# Patient Record
Sex: MALE | Race: BLACK OR AFRICAN AMERICAN | NOT HISPANIC OR LATINO | URBAN - METROPOLITAN AREA
[De-identification: names, ages, dates, MRNs, and addresses within clinical notes are randomized per-mention and may not be internally consistent; named-entity substitution may affect disease eponyms.]

---

## 2017-01-28 ENCOUNTER — INPATIENT (INPATIENT)
Facility: HOSPITAL | Age: 82
LOS: 10 days | Discharge: NURSING, REHAB OR LONG-TERM CARE | End: 2017-02-08
Attending: HOSPITALIST | Admitting: HOSPITALIST
Payer: MEDICARE

## 2017-01-28 VITALS
SYSTOLIC BLOOD PRESSURE: 185 MMHG | RESPIRATION RATE: 16 BRPM | HEART RATE: 76 BPM | TEMPERATURE: 98 F | DIASTOLIC BLOOD PRESSURE: 90 MMHG

## 2017-01-28 DIAGNOSIS — E03.9 HYPOTHYROIDISM, UNSPECIFIED: ICD-10-CM

## 2017-01-28 DIAGNOSIS — F03.91 UNSPECIFIED DEMENTIA WITH BEHAVIORAL DISTURBANCE: ICD-10-CM

## 2017-01-28 DIAGNOSIS — L30.9 DERMATITIS, UNSPECIFIED: ICD-10-CM

## 2017-01-28 DIAGNOSIS — D64.9 ANEMIA, UNSPECIFIED: ICD-10-CM

## 2017-01-28 DIAGNOSIS — I10 ESSENTIAL (PRIMARY) HYPERTENSION: ICD-10-CM

## 2017-01-28 DIAGNOSIS — G31.83 NEUROCOGNITIVE DISORDER WITH LEWY BODIES: ICD-10-CM

## 2017-01-28 LAB
ALBUMIN SERPL ELPH-MCNC: 3.4 G/DL — SIGNIFICANT CHANGE UP (ref 3.3–5)
ALP SERPL-CCNC: 83 U/L — SIGNIFICANT CHANGE UP (ref 40–120)
ALT FLD-CCNC: 19 U/L — SIGNIFICANT CHANGE UP (ref 4–41)
APPEARANCE UR: SIGNIFICANT CHANGE UP
AST SERPL-CCNC: 29 U/L — SIGNIFICANT CHANGE UP (ref 4–40)
BACTERIA # UR AUTO: SIGNIFICANT CHANGE UP
BASOPHILS # BLD AUTO: 0.09 K/UL — SIGNIFICANT CHANGE UP (ref 0–0.2)
BASOPHILS NFR BLD AUTO: 1.1 % — SIGNIFICANT CHANGE UP (ref 0–2)
BILIRUB SERPL-MCNC: 0.6 MG/DL — SIGNIFICANT CHANGE UP (ref 0.2–1.2)
BILIRUB UR-MCNC: NEGATIVE — SIGNIFICANT CHANGE UP
BLOOD UR QL VISUAL: NEGATIVE — SIGNIFICANT CHANGE UP
BUN SERPL-MCNC: 11 MG/DL — SIGNIFICANT CHANGE UP (ref 7–23)
CALCIUM SERPL-MCNC: 9.1 MG/DL — SIGNIFICANT CHANGE UP (ref 8.4–10.5)
CHLORIDE SERPL-SCNC: 104 MMOL/L — SIGNIFICANT CHANGE UP (ref 98–107)
CO2 SERPL-SCNC: 24 MMOL/L — SIGNIFICANT CHANGE UP (ref 22–31)
COLOR SPEC: YELLOW — SIGNIFICANT CHANGE UP
CREAT SERPL-MCNC: 0.9 MG/DL — SIGNIFICANT CHANGE UP (ref 0.5–1.3)
EOSINOPHIL # BLD AUTO: 0.26 K/UL — SIGNIFICANT CHANGE UP (ref 0–0.5)
EOSINOPHIL NFR BLD AUTO: 3.2 % — SIGNIFICANT CHANGE UP (ref 0–6)
GLUCOSE SERPL-MCNC: 102 MG/DL — HIGH (ref 70–99)
GLUCOSE UR-MCNC: NEGATIVE — SIGNIFICANT CHANGE UP
HCT VFR BLD CALC: 34.2 % — LOW (ref 39–50)
HGB BLD-MCNC: 11.3 G/DL — LOW (ref 13–17)
HYALINE CASTS # UR AUTO: SIGNIFICANT CHANGE UP (ref 0–?)
IMM GRANULOCYTES NFR BLD AUTO: 0.4 % — SIGNIFICANT CHANGE UP (ref 0–1.5)
KETONES UR-MCNC: NEGATIVE — SIGNIFICANT CHANGE UP
LEUKOCYTE ESTERASE UR-ACNC: NEGATIVE — SIGNIFICANT CHANGE UP
LYMPHOCYTES # BLD AUTO: 1.69 K/UL — SIGNIFICANT CHANGE UP (ref 1–3.3)
LYMPHOCYTES # BLD AUTO: 21.1 % — SIGNIFICANT CHANGE UP (ref 13–44)
MCHC RBC-ENTMCNC: 30 PG — SIGNIFICANT CHANGE UP (ref 27–34)
MCHC RBC-ENTMCNC: 33 % — SIGNIFICANT CHANGE UP (ref 32–36)
MCV RBC AUTO: 90.7 FL — SIGNIFICANT CHANGE UP (ref 80–100)
MONOCYTES # BLD AUTO: 0.71 K/UL — SIGNIFICANT CHANGE UP (ref 0–0.9)
MONOCYTES NFR BLD AUTO: 8.9 % — SIGNIFICANT CHANGE UP (ref 2–14)
MUCOUS THREADS # UR AUTO: SIGNIFICANT CHANGE UP
NEUTROPHILS # BLD AUTO: 5.23 K/UL — SIGNIFICANT CHANGE UP (ref 1.8–7.4)
NEUTROPHILS NFR BLD AUTO: 65.3 % — SIGNIFICANT CHANGE UP (ref 43–77)
NITRITE UR-MCNC: NEGATIVE — SIGNIFICANT CHANGE UP
NON-SQ EPI CELLS # UR AUTO: <1 — SIGNIFICANT CHANGE UP
PH UR: 6 — SIGNIFICANT CHANGE UP (ref 4.6–8)
PLATELET # BLD AUTO: 185 K/UL — SIGNIFICANT CHANGE UP (ref 150–400)
PMV BLD: 10.9 FL — SIGNIFICANT CHANGE UP (ref 7–13)
POTASSIUM SERPL-MCNC: 4.2 MMOL/L — SIGNIFICANT CHANGE UP (ref 3.5–5.3)
POTASSIUM SERPL-SCNC: 4.2 MMOL/L — SIGNIFICANT CHANGE UP (ref 3.5–5.3)
PROT SERPL-MCNC: 7.5 G/DL — SIGNIFICANT CHANGE UP (ref 6–8.3)
PROT UR-MCNC: 100 — SIGNIFICANT CHANGE UP
RBC # BLD: 3.77 M/UL — LOW (ref 4.2–5.8)
RBC # FLD: 12.9 % — SIGNIFICANT CHANGE UP (ref 10.3–14.5)
RBC CASTS # UR COMP ASSIST: SIGNIFICANT CHANGE UP (ref 0–?)
SODIUM SERPL-SCNC: 142 MMOL/L — SIGNIFICANT CHANGE UP (ref 135–145)
SP GR SPEC: 1.02 — SIGNIFICANT CHANGE UP (ref 1–1.03)
SQUAMOUS # UR AUTO: SIGNIFICANT CHANGE UP
TSH SERPL-MCNC: 4.3 UIU/ML — HIGH (ref 0.27–4.2)
UROBILINOGEN FLD QL: NORMAL E.U. — SIGNIFICANT CHANGE UP (ref 0.1–0.2)
WBC # BLD: 8.01 K/UL — SIGNIFICANT CHANGE UP (ref 3.8–10.5)
WBC # FLD AUTO: 8.01 K/UL — SIGNIFICANT CHANGE UP (ref 3.8–10.5)
WBC UR QL: SIGNIFICANT CHANGE UP (ref 0–?)

## 2017-01-28 PROCEDURE — 70450 CT HEAD/BRAIN W/O DYE: CPT | Mod: 26

## 2017-01-28 PROCEDURE — 99223 1ST HOSP IP/OBS HIGH 75: CPT

## 2017-01-28 PROCEDURE — 71020: CPT | Mod: 26

## 2017-01-28 RX ORDER — TIMOLOL 0.5 %
1 DROPS OPHTHALMIC (EYE)
Qty: 0 | Refills: 0 | COMMUNITY

## 2017-01-28 RX ORDER — QUETIAPINE FUMARATE 200 MG/1
50 TABLET, FILM COATED ORAL AT BEDTIME
Qty: 0 | Refills: 0 | Status: DISCONTINUED | OUTPATIENT
Start: 2017-01-28 | End: 2017-01-30

## 2017-01-28 RX ORDER — LEVOTHYROXINE SODIUM 125 MCG
1 TABLET ORAL
Qty: 0 | Refills: 0 | COMMUNITY

## 2017-01-28 RX ORDER — TIMOLOL 0.5 %
1 DROPS OPHTHALMIC (EYE) DAILY
Qty: 0 | Refills: 0 | Status: DISCONTINUED | OUTPATIENT
Start: 2017-01-28 | End: 2017-02-08

## 2017-01-28 RX ORDER — ENOXAPARIN SODIUM 100 MG/ML
40 INJECTION SUBCUTANEOUS EVERY 24 HOURS
Qty: 0 | Refills: 0 | Status: DISCONTINUED | OUTPATIENT
Start: 2017-01-28 | End: 2017-02-08

## 2017-01-28 RX ORDER — AMLODIPINE BESYLATE 2.5 MG/1
5 TABLET ORAL DAILY
Qty: 0 | Refills: 0 | Status: DISCONTINUED | OUTPATIENT
Start: 2017-01-28 | End: 2017-02-07

## 2017-01-28 RX ORDER — LEVOTHYROXINE SODIUM 125 MCG
75 TABLET ORAL DAILY
Qty: 0 | Refills: 0 | Status: DISCONTINUED | OUTPATIENT
Start: 2017-01-28 | End: 2017-02-08

## 2017-01-28 RX ORDER — SPIRONOLACTONE 25 MG/1
25 TABLET, FILM COATED ORAL DAILY
Qty: 0 | Refills: 0 | Status: DISCONTINUED | OUTPATIENT
Start: 2017-01-28 | End: 2017-02-08

## 2017-01-28 RX ADMIN — Medication 1 DROP(S): at 22:49

## 2017-01-28 RX ADMIN — ENOXAPARIN SODIUM 40 MILLIGRAM(S): 100 INJECTION SUBCUTANEOUS at 22:49

## 2017-01-28 RX ADMIN — QUETIAPINE FUMARATE 50 MILLIGRAM(S): 200 TABLET, FILM COATED ORAL at 22:49

## 2017-01-28 NOTE — PATIENT PROFILE ADULT. - REASON FOR ADMISSION
Pt extremely difficult to get history from. States he was walking in home and felt epigastric, CP and vertigo. Epigastric pain radiated to his back and lasted  approx 45 minutes and self resolved. Pt called Dr Silva and was told to go to ER

## 2017-01-28 NOTE — ED ADULT TRIAGE NOTE - CHIEF COMPLAINT QUOTE
as per ems wife called because Pt. would not get up off chair, he has been hallucinating and not taking meds. Wife stated Pt. is in early stages of dementia and has been agitated.

## 2017-01-28 NOTE — ED PROVIDER NOTE - CARDIAC, MLM
Normal rate, regular rhythm.  Heart sounds S1, S2.  No murmurs, rubs or gallops. Pitting edema to bilateral lower extremities to mid-shin.

## 2017-01-28 NOTE — ED ADULT NURSE REASSESSMENT NOTE - NS ED NURSE REASSESS COMMENT FT1
received report from RN, pt is awake with nurse at bedside, oriented to himself and wife but not to time or situation which wife states is patients baseline, VSS and patient attempting to provide urine specimen, no acute distress noted

## 2017-01-28 NOTE — ED PROVIDER NOTE - ATTENDING CONTRIBUTION TO CARE
I, Nakia Matthews M.D. have examined the patient and confirmed the essential components of the history, physical examination, diagnosis, and treatment plan. I agree with the patient's care as documented by the resident and amended herein by me. See note above for complete details of service.  80 yo M Hx HTN, Dementia, Hypothyroidism who was brought in my wife to ED for worsening confusion and safety issues at home. As per wife hallucinations are at baseline for the patient 2/2 dementia, however his confusion and agitation are not customary. Pt was noted to turn on the gas to the stove without lighting the  or cooking food. Wife was also unable to get him out of the house to his doctor's office. Wife who lives alone with pt without additional assistance is concerned about her own and the patient's safety given the current change in his mental status. No recent falls, trauma or infectious complaints. Plan - organic w/up for causes of AMS/worsening dementia. If w/up neg, social admission for julianna-psych eval and placement.

## 2017-01-28 NOTE — PATIENT PROFILE ADULT. - VISION (WITH CORRECTIVE LENSES IF THE PATIENT USUALLY WEARS THEM):
wears glasses for distance and reading/Partially impaired: cannot see medication labels or newsprint, but can see obstacles in path, and the surrounding layout; can count fingers at arm's length

## 2017-01-28 NOTE — H&P ADULT. - PROBLEM SELECTOR PLAN 1
Unclear if due to Alzheimer's vs. Lewy Body type but behavioral disturbance seems likely due to progression of dementia rather than organic cause   - Would call PCP/neurologist on Monday morning to obtain records of workup   - Check ammonia and B12 level in AM   - SW for disposition ([facility] vs. NH; wife wants to discuss options)  - C/w Quetiapine for behavioral disturbance; consider increasing dose if needed  - PT consult placed

## 2017-01-28 NOTE — ED PROVIDER NOTE - MEDICAL DECISION MAKING DETAILS
81M h/o dementia, hypothyroid p/w agitation, decreased appetite, VH, x2 wks, concerning for worsening dementia  -labs, ekg, cxr, u/a  -admit

## 2017-01-28 NOTE — ED ADULT NURSE REASSESSMENT NOTE - NS ED NURSE REASSESS COMMENT FT1
patient is incontinent and noted he has IAD on his buttocks, no drainage noted, patient is still confused and wife is at bedside.

## 2017-01-28 NOTE — H&P ADULT. - PROBLEM SELECTOR PLAN 4
Diffuse rash over body; seeing derm as outpatient and wife states he was recently started on what sounds like medrol dose pack and possibly steroid cream.   - Would call dermatologist office (Dr. Ayala [phone]) prior to starting either as etiology of rash unclear

## 2017-01-28 NOTE — H&P ADULT. - ASSESSMENT
80 yo M h/o HTN, hypothyroid, and dementia (Lewy-body vs Parkinson) who p/w with agitation and worsening confusion, admitted for psychosocial management and social disposition.

## 2017-01-28 NOTE — ED PROVIDER NOTE - OBJECTIVE STATEMENT
81M h/o HTN, hypothyroid, dementia (Lewy-body vs Parkinson) presenting with agitation. Pt's wife notes that for past 2 weeks, pt has had more frequent episodes of agitation, decreased appetite, has been sleeping more throughout day, confused, and had visual hallucinations of people in the house. Has had 2 previous visits to ER for same complaint, pt's wife does not feel she can care for patient at home any longer. Notes finding pt in apartment earlier today with smell of gas as he left stove on. ROS + urinary urgency. Denies F, cough, CP/SOB, abd pain, N/V/D.

## 2017-01-28 NOTE — H&P ADULT. - HISTORY OF PRESENT ILLNESS
This is an 82 yo M h/o HTN, hypothyroid, and dementia (Lewy-body vs Parkinson) who p/w with agitation and worsening confusion. Patient pleasantly demented and unable to give accurate history therefore, hx taken per wife at bedside who is patient's primary caretaker. Pt's wife notes that for past 2 weeks, pt has had more frequent episodes of agitation, decreased appetite, has been sleeping more throughout day, confused, and had visual hallucinations of people in the house. Has had 2 previous visits to ER for same complaint, pt's wife does not feel she can care for patient at home any longer. Notes finding pt in apartment earlier today with smell of gas as he left stove on. Of note, patient has had extensive workup by PCP (Dr. Sherman) along with neurologist as an outpatient. Also of note, patient has chronic diffuse scaling rash for which he was recently seen by dermatology, who prescribed him a medrol dose pack?? and topical steroid??. Denies any fevers, new chills, abdominal discomfort, frequency, urgency, dysuria, cough, CP/SOB, abd pain, and N/V/D. Patient admitted to medicine for social disposition given increasing burden on caretaker following negative lab workup.

## 2017-01-29 LAB
AMMONIA BLD-MCNC: 28 UMOL/L — SIGNIFICANT CHANGE UP (ref 11–55)
BUN SERPL-MCNC: 11 MG/DL — SIGNIFICANT CHANGE UP (ref 7–23)
CALCIUM SERPL-MCNC: 8.7 MG/DL — SIGNIFICANT CHANGE UP (ref 8.4–10.5)
CHLORIDE SERPL-SCNC: 102 MMOL/L — SIGNIFICANT CHANGE UP (ref 98–107)
CO2 SERPL-SCNC: 25 MMOL/L — SIGNIFICANT CHANGE UP (ref 22–31)
CREAT SERPL-MCNC: 0.9 MG/DL — SIGNIFICANT CHANGE UP (ref 0.5–1.3)
GLUCOSE SERPL-MCNC: 87 MG/DL — SIGNIFICANT CHANGE UP (ref 70–99)
HCT VFR BLD CALC: 32.5 % — LOW (ref 39–50)
HGB BLD-MCNC: 10.8 G/DL — LOW (ref 13–17)
MCHC RBC-ENTMCNC: 29.8 PG — SIGNIFICANT CHANGE UP (ref 27–34)
MCHC RBC-ENTMCNC: 33.2 % — SIGNIFICANT CHANGE UP (ref 32–36)
MCV RBC AUTO: 89.8 FL — SIGNIFICANT CHANGE UP (ref 80–100)
PLATELET # BLD AUTO: 180 K/UL — SIGNIFICANT CHANGE UP (ref 150–400)
PMV BLD: 10.8 FL — SIGNIFICANT CHANGE UP (ref 7–13)
POTASSIUM SERPL-MCNC: 3.8 MMOL/L — SIGNIFICANT CHANGE UP (ref 3.5–5.3)
POTASSIUM SERPL-SCNC: 3.8 MMOL/L — SIGNIFICANT CHANGE UP (ref 3.5–5.3)
RBC # BLD: 3.62 M/UL — LOW (ref 4.2–5.8)
RBC # FLD: 12.5 % — SIGNIFICANT CHANGE UP (ref 10.3–14.5)
SODIUM SERPL-SCNC: 141 MMOL/L — SIGNIFICANT CHANGE UP (ref 135–145)
VIT B12 SERPL-MCNC: 378 PG/ML — SIGNIFICANT CHANGE UP (ref 200–900)
WBC # BLD: 8.99 K/UL — SIGNIFICANT CHANGE UP (ref 3.8–10.5)
WBC # FLD AUTO: 8.99 K/UL — SIGNIFICANT CHANGE UP (ref 3.8–10.5)

## 2017-01-29 PROCEDURE — 99232 SBSQ HOSP IP/OBS MODERATE 35: CPT

## 2017-01-29 RX ADMIN — SPIRONOLACTONE 25 MILLIGRAM(S): 25 TABLET, FILM COATED ORAL at 07:02

## 2017-01-29 RX ADMIN — QUETIAPINE FUMARATE 50 MILLIGRAM(S): 200 TABLET, FILM COATED ORAL at 22:18

## 2017-01-29 RX ADMIN — Medication 1 DROP(S): at 12:50

## 2017-01-29 RX ADMIN — ENOXAPARIN SODIUM 40 MILLIGRAM(S): 100 INJECTION SUBCUTANEOUS at 22:18

## 2017-01-29 RX ADMIN — AMLODIPINE BESYLATE 5 MILLIGRAM(S): 2.5 TABLET ORAL at 07:01

## 2017-01-29 RX ADMIN — Medication 75 MICROGRAM(S): at 07:01

## 2017-01-29 RX ADMIN — Medication 1 APPLICATION(S): at 22:18

## 2017-01-30 ENCOUNTER — TRANSCRIPTION ENCOUNTER (OUTPATIENT)
Age: 82
End: 2017-01-30

## 2017-01-30 LAB
BUN SERPL-MCNC: 14 MG/DL — SIGNIFICANT CHANGE UP (ref 7–23)
CALCIUM SERPL-MCNC: 8.6 MG/DL — SIGNIFICANT CHANGE UP (ref 8.4–10.5)
CHLORIDE SERPL-SCNC: 103 MMOL/L — SIGNIFICANT CHANGE UP (ref 98–107)
CO2 SERPL-SCNC: 23 MMOL/L — SIGNIFICANT CHANGE UP (ref 22–31)
CREAT SERPL-MCNC: 0.99 MG/DL — SIGNIFICANT CHANGE UP (ref 0.5–1.3)
GLUCOSE SERPL-MCNC: 77 MG/DL — SIGNIFICANT CHANGE UP (ref 70–99)
HCT VFR BLD CALC: 32.9 % — LOW (ref 39–50)
HGB BLD-MCNC: 10.9 G/DL — LOW (ref 13–17)
MCHC RBC-ENTMCNC: 29.5 PG — SIGNIFICANT CHANGE UP (ref 27–34)
MCHC RBC-ENTMCNC: 33.1 % — SIGNIFICANT CHANGE UP (ref 32–36)
MCV RBC AUTO: 89.2 FL — SIGNIFICANT CHANGE UP (ref 80–100)
PLATELET # BLD AUTO: 176 K/UL — SIGNIFICANT CHANGE UP (ref 150–400)
PMV BLD: 11 FL — SIGNIFICANT CHANGE UP (ref 7–13)
POTASSIUM SERPL-MCNC: 4 MMOL/L — SIGNIFICANT CHANGE UP (ref 3.5–5.3)
POTASSIUM SERPL-SCNC: 4 MMOL/L — SIGNIFICANT CHANGE UP (ref 3.5–5.3)
RBC # BLD: 3.69 M/UL — LOW (ref 4.2–5.8)
RBC # FLD: 12.4 % — SIGNIFICANT CHANGE UP (ref 10.3–14.5)
SODIUM SERPL-SCNC: 139 MMOL/L — SIGNIFICANT CHANGE UP (ref 135–145)
WBC # BLD: 9.39 K/UL — SIGNIFICANT CHANGE UP (ref 3.8–10.5)
WBC # FLD AUTO: 9.39 K/UL — SIGNIFICANT CHANGE UP (ref 3.8–10.5)

## 2017-01-30 PROCEDURE — 99233 SBSQ HOSP IP/OBS HIGH 50: CPT

## 2017-01-30 PROCEDURE — 73070 X-RAY EXAM OF ELBOW: CPT | Mod: 26,RT

## 2017-01-30 PROCEDURE — 99223 1ST HOSP IP/OBS HIGH 75: CPT

## 2017-01-30 PROCEDURE — 73060 X-RAY EXAM OF HUMERUS: CPT | Mod: 26,RT

## 2017-01-30 PROCEDURE — 73030 X-RAY EXAM OF SHOULDER: CPT | Mod: 26,RT

## 2017-01-30 PROCEDURE — 99223 1ST HOSP IP/OBS HIGH 75: CPT | Mod: GC

## 2017-01-30 RX ORDER — KETOCONAZOLE 20 MG/G
1 AEROSOL, FOAM TOPICAL
Qty: 0 | Refills: 0 | Status: DISCONTINUED | OUTPATIENT
Start: 2017-01-30 | End: 2017-02-08

## 2017-01-30 RX ORDER — OLANZAPINE 15 MG/1
2.5 TABLET, FILM COATED ORAL EVERY 6 HOURS
Qty: 0 | Refills: 0 | Status: DISCONTINUED | OUTPATIENT
Start: 2017-01-30 | End: 2017-02-06

## 2017-01-30 RX ORDER — QUETIAPINE FUMARATE 200 MG/1
50 TABLET, FILM COATED ORAL
Qty: 0 | Refills: 0 | Status: DISCONTINUED | OUTPATIENT
Start: 2017-01-30 | End: 2017-02-01

## 2017-01-30 RX ORDER — OLANZAPINE 15 MG/1
2.5 TABLET, FILM COATED ORAL EVERY 6 HOURS
Qty: 0 | Refills: 0 | Status: DISCONTINUED | OUTPATIENT
Start: 2017-01-30 | End: 2017-02-08

## 2017-01-30 RX ORDER — PETROLATUM,WHITE
1 JELLY (GRAM) TOPICAL THREE TIMES A DAY
Qty: 0 | Refills: 0 | Status: DISCONTINUED | OUTPATIENT
Start: 2017-01-30 | End: 2017-02-08

## 2017-01-30 RX ADMIN — Medication 1 APPLICATION(S): at 06:08

## 2017-01-30 RX ADMIN — QUETIAPINE FUMARATE 50 MILLIGRAM(S): 200 TABLET, FILM COATED ORAL at 19:06

## 2017-01-30 RX ADMIN — AMLODIPINE BESYLATE 5 MILLIGRAM(S): 2.5 TABLET ORAL at 06:08

## 2017-01-30 RX ADMIN — SPIRONOLACTONE 25 MILLIGRAM(S): 25 TABLET, FILM COATED ORAL at 06:08

## 2017-01-30 RX ADMIN — ENOXAPARIN SODIUM 40 MILLIGRAM(S): 100 INJECTION SUBCUTANEOUS at 23:00

## 2017-01-30 RX ADMIN — Medication 75 MICROGRAM(S): at 06:08

## 2017-01-30 RX ADMIN — Medication 1 APPLICATION(S): at 23:00

## 2017-01-30 RX ADMIN — KETOCONAZOLE 1 APPLICATION(S): 20 AEROSOL, FOAM TOPICAL at 23:00

## 2017-01-30 RX ADMIN — Medication 1 APPLICATION(S): at 23:03

## 2017-01-30 RX ADMIN — Medication 1 DROP(S): at 11:38

## 2017-01-30 RX ADMIN — Medication 1 APPLICATION(S): at 17:04

## 2017-01-30 NOTE — DISCHARGE NOTE ADULT - MEDICATION SUMMARY - MEDICATIONS TO STOP TAKING
I will STOP taking the medications listed below when I get home from the hospital:    Medrol Dosepak 4 mg oral tablet  -- 1 milligram(s) by mouth

## 2017-01-30 NOTE — DISCHARGE NOTE ADULT - PATIENT PORTAL LINK FT
“You can access the Follow[facility] Patient Portal, offered by [facility], by registering with the following website: [email or web address]”

## 2017-01-30 NOTE — DISCHARGE NOTE ADULT - HOSPITAL COURSE
This is an 80 yo M h/o HTN, hypothyroid, and dementia (Lewy-body vs Parkinson) who p/w with agitation and worsening confusion. Patient pleasantly demented and unable to give accurate history therefore, hx taken per wife at bedside who is patient's primary caretaker. Pt's wife notes that for past 2 weeks, pt has had more frequent episodes of agitation, decreased appetite, has been sleeping more throughout day, confused, and had visual hallucinations of people in the house. Has had 2 previous visits to ER for same complaint, pt's wife does not feel she can care for patient at home any longer. Notes finding pt in apartment earlier today with smell of gas as he left stove on. Of note, patient has had extensive workup by PCP (Dr. Sherman) along with neurologist as an outpatient. Also of note, patient has chronic diffuse scaling rash for which he was recently seen by dermatology, who prescribed him a medrol dose pack?? and topical steroid??. Denies any fevers, new chills, abdominal discomfort, frequency, urgency, dysuria, cough, CP/SOB, abd pain, and N/V/D. Patient admitted to medicine for social disposition given increasing burden on caretaker following negative lab workup.     HOSPITAL COURSE:  Patient had CT of head, CBc/Ua/ xry of  sgunts - all wnl.  He was no longer aggitated but he was confused OX1 only- Psych was called to evaluate.  He was also noted to have rash with areas of excoriations- [facility]Dermatology consult called ( Dr Odonnell does not come to [facility]).  SW alerted that wife needed help with patient. This is an 82 yo M h/o HTN, hypothyroid, and dementia (Lewy-body vs Parkinson) who p/w with agitation and worsening confusion. Patient pleasantly demented and unable to give accurate history therefore, hx taken per wife at bedside who is patient's primary caretaker. Pt's wife notes that for past 2 weeks, pt has had more frequent episodes of agitation, decreased appetite, has been sleeping more throughout day, confused, and had visual hallucinations of people in the house. Has had 2 previous visits to ER for same complaint, pt's wife does not feel she can care for patient at home any longer. Notes finding pt in apartment earlier today with smell of gas as he left stove on. Of note, patient has had extensive workup by PCP (Dr. Sherman) along with neurologist as an outpatient. Also of note, patient has chronic diffuse scaling rash for which he was recently seen by dermatology, who prescribed him a medrol dose pack?? and topical steroid??. Denies any fevers, new chills, abdominal discomfort, frequency, urgency, dysuria, cough, CP/SOB, abd pain, and N/V/D. Patient admitted to medicine for social disposition given increasing burden on caretaker following negative lab workup.     HOSPITAL COURSE:  Patient had CT of head, CBc/Ua/ xry of  shunts - all wnl.  He was no longer agitated but he was confused OX1 only- Psych was called to evaluate.  He was also noted to have rash with areas of excoriations- [facility]Dermatology consult called ( Dr Odonnell does not come to [facility]).  SW alerted that wife needed help with patient.  Dermatology dx patient with dermatitis and cutaneus candidiasis  treated with Nizoral to groin and folds and Kenolog .  Psychiatry resumed his home medications and patient was no longer agitated - remained pleasantly confused.  Xrays or R shoulders/r elbows and L hand- no acute fractures. He as DJD with bony osteophites.  PT rec rehab with rolling walker.  He was d/c to ................. on .................. This is an 80 yo M h/o HTN, hypothyroid, and dementia (Lewy-body vs Parkinson) who p/w with agitation and worsening confusion. Patient pleasantly demented and unable to give accurate history therefore, hx taken per wife at bedside who is patient's primary caretaker. Pt's wife notes that for past 2 weeks, pt has had more frequent episodes of agitation, decreased appetite, has been sleeping more throughout day, confused, and had visual hallucinations of people in the house. Has had 2 previous visits to ER for same complaint, pt's wife does not feel she can care for patient at home any longer. Notes finding pt in apartment earlier today with smell of gas as he left stove on. Of note, patient has had extensive workup by PCP (Dr. Sherman) along with neurologist as an outpatient. Also of note, patient has chronic diffuse scaling rash for which he was recently seen by dermatology, who prescribed him a medrol dose pack?? and topical steroid??. Denies any fevers, new chills, abdominal discomfort, frequency, urgency, dysuria, cough, CP/SOB, abd pain, and N/V/D. Patient admitted to medicine for social disposition given increasing burden on caretaker following negative lab workup.     HOSPITAL COURSE:  Patient had CT of head, CBc/Ua/ xry of  shunts - all wnl.  He was no longer agitated but he was confused OX1 only- Psych was called to evaluate.  He was also noted to have rash with areas of excoriations- [facility]atology consult called ( Dr Odonnell does not come to [facility]).  SW alerted that wife needed help with patient.  Dermatology dx patient with dermatitis and cutaneus candidiasis  treated with Nizoral to groin and folds and Kenolog .  Psychiatry resumed his home medications and patient was no longer agitated - remained pleasantly confused.  Xrays or R shoulders/r elbows and L hand- no acute fractures. He as DJD with bony osteophites.  PT rec rehab with rolling walker.  He was d/c to  REHAB on ..................

## 2017-01-30 NOTE — DISCHARGE NOTE ADULT - MEDICATION SUMMARY - MEDICATIONS TO TAKE
I will START or STAY ON the medications listed below when I get home from the hospital:    Aldactone 25 mg oral tablet  -- 1 tab(s) by mouth once a day  -- Indication: For Essential hypertension    mirtazapine 7.5 mg oral tablet  -- 1 tab(s) by mouth once a day (at bedtime)  -- Indication: For Appetite stimulator    QUEtiapine 50 mg oral tablet  -- 1 tab(s) by mouth   given at 5:30 pm  -- Indication: For Antipsychotic    QUEtiapine 25 mg oral tablet  -- 1 tab(s) by mouth once a day  to be given at 11 am  -- Indication: For Antipsychotic    Norvasc 5 mg oral tablet  -- 1 tab(s) by mouth once a day  -- Indication: For Essential hypertension    ketoconazole 2% topical cream  -- 1 application on skin 2 times a day  -- Indication: For skin care    triamcinolone 0.1% topical ointment  -- 1 application on skin 2 times a day  -- Indication: For skin care     petrolatum topical ointment  -- 1 application on skin 3 times a day  -- Indication: For skin care    senna oral tablet  -- 2 tab(s) by mouth once a day (at bedtime)  -- Indication: For constipation    docusate sodium 100 mg oral capsule  -- 1 cap(s) by mouth 3 times a day  -- Indication: For constipation    polyethylene glycol 3350 oral powder for reconstitution  -- 17 gram(s) by mouth once a day  -- Indication: For constipation    timolol hemihydrate 0.5% ophthalmic solution  -- 1 drop(s) to each affected eye once a day  -- Indication: For glaucoma    levothyroxine 75 mcg (0.075 mg) oral tablet  -- 1 tab(s) by mouth once a day  -- Indication: For Hypothyroidism

## 2017-01-30 NOTE — DISCHARGE NOTE ADULT - MEDICATION SUMMARY - MEDICATIONS TO CHANGE
I will SWITCH the dose or number of times a day I take the medications listed below when I get home from the hospital:    QUEtiapine 50 mg oral tablet  -- 1 tab(s) by mouth once a day (at bedtime)

## 2017-01-30 NOTE — DISCHARGE NOTE ADULT - OTHER SIGNIFICANT FINDINGS
Ct of R [place] 1/31/17-  IMPRESSION:  Confirmed presence of a degenerative loose osseous body in the distal   humeral olecranon fossa corresponding to the observation on the   radiographic evaluation.    Degenerative changes as above.

## 2017-01-30 NOTE — DISCHARGE NOTE ADULT - CARE PLAN
Principal Discharge DX:	Lewy body dementia with behavioral disturbance  Goal:	control  Instructions for follow-up, activity and diet:	c/w seroquel and zyprexa  Secondary Diagnosis:	Acquired hypothyroidism  Instructions for follow-up, activity and diet:	c/w Synthroid  Secondary Diagnosis:	Hypertension  Instructions for follow-up, activity and diet:	c/w norvasc

## 2017-01-31 LAB
BUN SERPL-MCNC: 15 MG/DL — SIGNIFICANT CHANGE UP (ref 7–23)
CALCIUM SERPL-MCNC: 8.6 MG/DL — SIGNIFICANT CHANGE UP (ref 8.4–10.5)
CHLORIDE SERPL-SCNC: 101 MMOL/L — SIGNIFICANT CHANGE UP (ref 98–107)
CO2 SERPL-SCNC: 25 MMOL/L — SIGNIFICANT CHANGE UP (ref 22–31)
CREAT SERPL-MCNC: 0.92 MG/DL — SIGNIFICANT CHANGE UP (ref 0.5–1.3)
GLUCOSE SERPL-MCNC: 91 MG/DL — SIGNIFICANT CHANGE UP (ref 70–99)
HCT VFR BLD CALC: 33.4 % — LOW (ref 39–50)
HGB BLD-MCNC: 11.6 G/DL — LOW (ref 13–17)
MCHC RBC-ENTMCNC: 30.9 PG — SIGNIFICANT CHANGE UP (ref 27–34)
MCHC RBC-ENTMCNC: 34.7 % — SIGNIFICANT CHANGE UP (ref 32–36)
MCV RBC AUTO: 88.8 FL — SIGNIFICANT CHANGE UP (ref 80–100)
PLATELET # BLD AUTO: 190 K/UL — SIGNIFICANT CHANGE UP (ref 150–400)
PMV BLD: 10.6 FL — SIGNIFICANT CHANGE UP (ref 7–13)
POTASSIUM SERPL-MCNC: 3.9 MMOL/L — SIGNIFICANT CHANGE UP (ref 3.5–5.3)
POTASSIUM SERPL-SCNC: 3.9 MMOL/L — SIGNIFICANT CHANGE UP (ref 3.5–5.3)
RBC # BLD: 3.76 M/UL — LOW (ref 4.2–5.8)
RBC # FLD: 12.4 % — SIGNIFICANT CHANGE UP (ref 10.3–14.5)
SODIUM SERPL-SCNC: 138 MMOL/L — SIGNIFICANT CHANGE UP (ref 135–145)
WBC # BLD: 10.1 K/UL — SIGNIFICANT CHANGE UP (ref 3.8–10.5)
WBC # FLD AUTO: 10.1 K/UL — SIGNIFICANT CHANGE UP (ref 3.8–10.5)

## 2017-01-31 PROCEDURE — 73130 X-RAY EXAM OF HAND: CPT | Mod: 26,RT

## 2017-01-31 PROCEDURE — 99233 SBSQ HOSP IP/OBS HIGH 50: CPT

## 2017-01-31 RX ADMIN — Medication 1 APPLICATION(S): at 18:06

## 2017-01-31 RX ADMIN — Medication 1 APPLICATION(S): at 14:07

## 2017-01-31 RX ADMIN — Medication 1 APPLICATION(S): at 06:25

## 2017-01-31 RX ADMIN — KETOCONAZOLE 1 APPLICATION(S): 20 AEROSOL, FOAM TOPICAL at 18:05

## 2017-01-31 RX ADMIN — SPIRONOLACTONE 25 MILLIGRAM(S): 25 TABLET, FILM COATED ORAL at 06:24

## 2017-01-31 RX ADMIN — ENOXAPARIN SODIUM 40 MILLIGRAM(S): 100 INJECTION SUBCUTANEOUS at 22:01

## 2017-01-31 RX ADMIN — Medication 1 APPLICATION(S): at 22:01

## 2017-01-31 RX ADMIN — AMLODIPINE BESYLATE 5 MILLIGRAM(S): 2.5 TABLET ORAL at 06:24

## 2017-01-31 RX ADMIN — Medication 1 APPLICATION(S): at 06:24

## 2017-01-31 RX ADMIN — Medication 1 DROP(S): at 11:49

## 2017-01-31 RX ADMIN — OLANZAPINE 2.5 MILLIGRAM(S): 15 TABLET, FILM COATED ORAL at 17:50

## 2017-01-31 RX ADMIN — KETOCONAZOLE 1 APPLICATION(S): 20 AEROSOL, FOAM TOPICAL at 06:24

## 2017-01-31 RX ADMIN — Medication 75 MICROGRAM(S): at 06:24

## 2017-02-01 PROCEDURE — 99232 SBSQ HOSP IP/OBS MODERATE 35: CPT

## 2017-02-01 PROCEDURE — 99233 SBSQ HOSP IP/OBS HIGH 50: CPT

## 2017-02-01 PROCEDURE — 73200 CT UPPER EXTREMITY W/O DYE: CPT | Mod: 26,RT

## 2017-02-01 RX ORDER — QUETIAPINE FUMARATE 200 MG/1
50 TABLET, FILM COATED ORAL
Qty: 0 | Refills: 0 | Status: DISCONTINUED | OUTPATIENT
Start: 2017-02-01 | End: 2017-02-08

## 2017-02-01 RX ADMIN — Medication 75 MICROGRAM(S): at 06:46

## 2017-02-01 RX ADMIN — Medication 1 APPLICATION(S): at 21:05

## 2017-02-01 RX ADMIN — QUETIAPINE FUMARATE 50 MILLIGRAM(S): 200 TABLET, FILM COATED ORAL at 21:04

## 2017-02-01 RX ADMIN — AMLODIPINE BESYLATE 5 MILLIGRAM(S): 2.5 TABLET ORAL at 06:46

## 2017-02-01 RX ADMIN — ENOXAPARIN SODIUM 40 MILLIGRAM(S): 100 INJECTION SUBCUTANEOUS at 23:19

## 2017-02-01 RX ADMIN — SPIRONOLACTONE 25 MILLIGRAM(S): 25 TABLET, FILM COATED ORAL at 06:46

## 2017-02-01 RX ADMIN — KETOCONAZOLE 1 APPLICATION(S): 20 AEROSOL, FOAM TOPICAL at 07:29

## 2017-02-01 RX ADMIN — Medication 1 APPLICATION(S): at 07:29

## 2017-02-01 RX ADMIN — Medication 1 APPLICATION(S): at 17:09

## 2017-02-01 RX ADMIN — Medication 1 APPLICATION(S): at 06:46

## 2017-02-01 RX ADMIN — OLANZAPINE 2.5 MILLIGRAM(S): 15 TABLET, FILM COATED ORAL at 07:35

## 2017-02-01 RX ADMIN — Medication 1 DROP(S): at 11:21

## 2017-02-01 RX ADMIN — KETOCONAZOLE 1 APPLICATION(S): 20 AEROSOL, FOAM TOPICAL at 17:09

## 2017-02-01 RX ADMIN — Medication 1 APPLICATION(S): at 13:06

## 2017-02-01 NOTE — PHYSICAL THERAPY INITIAL EVALUATION ADULT - PERTINENT HX OF CURRENT PROBLEM, REHAB EVAL
for past 2 weeks, pt has had more frequent episodes of agitation, decreased appetite, has been sleeping more throughout day, confused, and had visual hallucinations

## 2017-02-01 NOTE — PHYSICAL THERAPY INITIAL EVALUATION ADULT - DIAGNOSIS, PT EVAL
Dementia with behavioral disturbance, unspecified dementia type; Unclear if due to Alzheimer's vs. Lewy Body type

## 2017-02-01 NOTE — PHYSICAL THERAPY INITIAL EVALUATION ADULT - RANGE OF MOTION EXAMINATION, REHAB EVAL
Pt not following VCs for AAROM Assessment; AAROM/PROM L UE and L LE Grossly WFL; R UE and R LE limited at end-range t/o.

## 2017-02-02 PROCEDURE — 99233 SBSQ HOSP IP/OBS HIGH 50: CPT

## 2017-02-02 RX ADMIN — KETOCONAZOLE 1 APPLICATION(S): 20 AEROSOL, FOAM TOPICAL at 06:57

## 2017-02-02 RX ADMIN — KETOCONAZOLE 1 APPLICATION(S): 20 AEROSOL, FOAM TOPICAL at 18:24

## 2017-02-02 RX ADMIN — Medication 1 DROP(S): at 12:56

## 2017-02-02 RX ADMIN — Medication 1 APPLICATION(S): at 06:57

## 2017-02-02 RX ADMIN — SPIRONOLACTONE 25 MILLIGRAM(S): 25 TABLET, FILM COATED ORAL at 06:57

## 2017-02-02 RX ADMIN — QUETIAPINE FUMARATE 50 MILLIGRAM(S): 200 TABLET, FILM COATED ORAL at 18:24

## 2017-02-02 RX ADMIN — Medication 75 MICROGRAM(S): at 06:57

## 2017-02-02 RX ADMIN — Medication 1 APPLICATION(S): at 18:23

## 2017-02-02 RX ADMIN — AMLODIPINE BESYLATE 5 MILLIGRAM(S): 2.5 TABLET ORAL at 06:57

## 2017-02-02 RX ADMIN — OLANZAPINE 2.5 MILLIGRAM(S): 15 TABLET, FILM COATED ORAL at 16:19

## 2017-02-02 RX ADMIN — OLANZAPINE 2.5 MILLIGRAM(S): 15 TABLET, FILM COATED ORAL at 18:23

## 2017-02-03 PROCEDURE — 99232 SBSQ HOSP IP/OBS MODERATE 35: CPT

## 2017-02-03 RX ADMIN — Medication 1 APPLICATION(S): at 14:00

## 2017-02-03 RX ADMIN — Medication 75 MICROGRAM(S): at 06:11

## 2017-02-03 RX ADMIN — Medication 1 APPLICATION(S): at 18:26

## 2017-02-03 RX ADMIN — SPIRONOLACTONE 25 MILLIGRAM(S): 25 TABLET, FILM COATED ORAL at 06:11

## 2017-02-03 RX ADMIN — Medication 1 APPLICATION(S): at 06:11

## 2017-02-03 RX ADMIN — ENOXAPARIN SODIUM 40 MILLIGRAM(S): 100 INJECTION SUBCUTANEOUS at 00:47

## 2017-02-03 RX ADMIN — KETOCONAZOLE 1 APPLICATION(S): 20 AEROSOL, FOAM TOPICAL at 06:11

## 2017-02-03 RX ADMIN — Medication 1 DROP(S): at 12:20

## 2017-02-03 RX ADMIN — KETOCONAZOLE 1 APPLICATION(S): 20 AEROSOL, FOAM TOPICAL at 18:27

## 2017-02-03 RX ADMIN — Medication 1 APPLICATION(S): at 00:48

## 2017-02-03 RX ADMIN — AMLODIPINE BESYLATE 5 MILLIGRAM(S): 2.5 TABLET ORAL at 06:11

## 2017-02-03 RX ADMIN — OLANZAPINE 2.5 MILLIGRAM(S): 15 TABLET, FILM COATED ORAL at 23:00

## 2017-02-03 RX ADMIN — ENOXAPARIN SODIUM 40 MILLIGRAM(S): 100 INJECTION SUBCUTANEOUS at 22:47

## 2017-02-03 RX ADMIN — Medication 1 APPLICATION(S): at 22:47

## 2017-02-04 LAB
BUN SERPL-MCNC: 21 MG/DL — SIGNIFICANT CHANGE UP (ref 7–23)
CALCIUM SERPL-MCNC: 9.9 MG/DL — SIGNIFICANT CHANGE UP (ref 8.4–10.5)
CHLORIDE SERPL-SCNC: 107 MMOL/L — SIGNIFICANT CHANGE UP (ref 98–107)
CO2 SERPL-SCNC: 23 MMOL/L — SIGNIFICANT CHANGE UP (ref 22–31)
CREAT SERPL-MCNC: 0.96 MG/DL — SIGNIFICANT CHANGE UP (ref 0.5–1.3)
GLUCOSE SERPL-MCNC: 122 MG/DL — HIGH (ref 70–99)
HCT VFR BLD CALC: 38.9 % — LOW (ref 39–50)
HGB BLD-MCNC: 12.7 G/DL — LOW (ref 13–17)
MCHC RBC-ENTMCNC: 29.8 PG — SIGNIFICANT CHANGE UP (ref 27–34)
MCHC RBC-ENTMCNC: 32.6 % — SIGNIFICANT CHANGE UP (ref 32–36)
MCV RBC AUTO: 91.3 FL — SIGNIFICANT CHANGE UP (ref 80–100)
PLATELET # BLD AUTO: 272 K/UL — SIGNIFICANT CHANGE UP (ref 150–400)
PMV BLD: 10.4 FL — SIGNIFICANT CHANGE UP (ref 7–13)
POTASSIUM SERPL-MCNC: 3.9 MMOL/L — SIGNIFICANT CHANGE UP (ref 3.5–5.3)
POTASSIUM SERPL-SCNC: 3.9 MMOL/L — SIGNIFICANT CHANGE UP (ref 3.5–5.3)
RBC # BLD: 4.26 M/UL — SIGNIFICANT CHANGE UP (ref 4.2–5.8)
RBC # FLD: 12.5 % — SIGNIFICANT CHANGE UP (ref 10.3–14.5)
SODIUM SERPL-SCNC: 146 MMOL/L — HIGH (ref 135–145)
WBC # BLD: 7.74 K/UL — SIGNIFICANT CHANGE UP (ref 3.8–10.5)
WBC # FLD AUTO: 7.74 K/UL — SIGNIFICANT CHANGE UP (ref 3.8–10.5)

## 2017-02-04 PROCEDURE — 99231 SBSQ HOSP IP/OBS SF/LOW 25: CPT

## 2017-02-04 RX ORDER — SODIUM CHLORIDE 9 MG/ML
1000 INJECTION INTRAMUSCULAR; INTRAVENOUS; SUBCUTANEOUS
Qty: 0 | Refills: 0 | Status: DISCONTINUED | OUTPATIENT
Start: 2017-02-04 | End: 2017-02-05

## 2017-02-04 RX ADMIN — Medication 1 APPLICATION(S): at 06:25

## 2017-02-04 RX ADMIN — KETOCONAZOLE 1 APPLICATION(S): 20 AEROSOL, FOAM TOPICAL at 06:24

## 2017-02-04 RX ADMIN — Medication 1 DROP(S): at 12:06

## 2017-02-04 RX ADMIN — SPIRONOLACTONE 25 MILLIGRAM(S): 25 TABLET, FILM COATED ORAL at 06:20

## 2017-02-04 RX ADMIN — Medication 1 APPLICATION(S): at 21:16

## 2017-02-04 RX ADMIN — AMLODIPINE BESYLATE 5 MILLIGRAM(S): 2.5 TABLET ORAL at 06:20

## 2017-02-04 RX ADMIN — Medication 1 APPLICATION(S): at 17:47

## 2017-02-04 RX ADMIN — KETOCONAZOLE 1 APPLICATION(S): 20 AEROSOL, FOAM TOPICAL at 17:48

## 2017-02-04 RX ADMIN — Medication 1 APPLICATION(S): at 13:20

## 2017-02-04 RX ADMIN — OLANZAPINE 2.5 MILLIGRAM(S): 15 TABLET, FILM COATED ORAL at 06:20

## 2017-02-04 RX ADMIN — ENOXAPARIN SODIUM 40 MILLIGRAM(S): 100 INJECTION SUBCUTANEOUS at 22:25

## 2017-02-04 RX ADMIN — OLANZAPINE 2.5 MILLIGRAM(S): 15 TABLET, FILM COATED ORAL at 18:03

## 2017-02-04 RX ADMIN — SODIUM CHLORIDE 50 MILLILITER(S): 9 INJECTION INTRAMUSCULAR; INTRAVENOUS; SUBCUTANEOUS at 21:15

## 2017-02-04 RX ADMIN — Medication 75 MICROGRAM(S): at 06:20

## 2017-02-05 LAB
BUN SERPL-MCNC: 27 MG/DL — HIGH (ref 7–23)
CALCIUM SERPL-MCNC: 10.1 MG/DL — SIGNIFICANT CHANGE UP (ref 8.4–10.5)
CHLORIDE SERPL-SCNC: 107 MMOL/L — SIGNIFICANT CHANGE UP (ref 98–107)
CO2 SERPL-SCNC: 25 MMOL/L — SIGNIFICANT CHANGE UP (ref 22–31)
CREAT SERPL-MCNC: 1.15 MG/DL — SIGNIFICANT CHANGE UP (ref 0.5–1.3)
GLUCOSE SERPL-MCNC: 94 MG/DL — SIGNIFICANT CHANGE UP (ref 70–99)
HCT VFR BLD CALC: 39 % — SIGNIFICANT CHANGE UP (ref 39–50)
HGB BLD-MCNC: 12.6 G/DL — LOW (ref 13–17)
MCHC RBC-ENTMCNC: 29.7 PG — SIGNIFICANT CHANGE UP (ref 27–34)
MCHC RBC-ENTMCNC: 32.3 % — SIGNIFICANT CHANGE UP (ref 32–36)
MCV RBC AUTO: 92 FL — SIGNIFICANT CHANGE UP (ref 80–100)
PLATELET # BLD AUTO: 286 K/UL — SIGNIFICANT CHANGE UP (ref 150–400)
PMV BLD: 10.4 FL — SIGNIFICANT CHANGE UP (ref 7–13)
POTASSIUM SERPL-MCNC: 4.1 MMOL/L — SIGNIFICANT CHANGE UP (ref 3.5–5.3)
POTASSIUM SERPL-SCNC: 4.1 MMOL/L — SIGNIFICANT CHANGE UP (ref 3.5–5.3)
RBC # BLD: 4.24 M/UL — SIGNIFICANT CHANGE UP (ref 4.2–5.8)
RBC # FLD: 12.5 % — SIGNIFICANT CHANGE UP (ref 10.3–14.5)
SODIUM SERPL-SCNC: 149 MMOL/L — HIGH (ref 135–145)
WBC # BLD: 7.5 K/UL — SIGNIFICANT CHANGE UP (ref 3.8–10.5)
WBC # FLD AUTO: 7.5 K/UL — SIGNIFICANT CHANGE UP (ref 3.8–10.5)

## 2017-02-05 PROCEDURE — 99231 SBSQ HOSP IP/OBS SF/LOW 25: CPT

## 2017-02-05 RX ORDER — SODIUM CHLORIDE 9 MG/ML
1000 INJECTION, SOLUTION INTRAVENOUS
Qty: 0 | Refills: 0 | Status: DISCONTINUED | OUTPATIENT
Start: 2017-02-05 | End: 2017-02-06

## 2017-02-05 RX ADMIN — Medication 1 APPLICATION(S): at 06:52

## 2017-02-05 RX ADMIN — Medication 75 MICROGRAM(S): at 06:53

## 2017-02-05 RX ADMIN — KETOCONAZOLE 1 APPLICATION(S): 20 AEROSOL, FOAM TOPICAL at 06:52

## 2017-02-05 RX ADMIN — SODIUM CHLORIDE 80 MILLILITER(S): 9 INJECTION, SOLUTION INTRAVENOUS at 09:37

## 2017-02-05 RX ADMIN — KETOCONAZOLE 1 APPLICATION(S): 20 AEROSOL, FOAM TOPICAL at 17:08

## 2017-02-05 RX ADMIN — AMLODIPINE BESYLATE 5 MILLIGRAM(S): 2.5 TABLET ORAL at 06:53

## 2017-02-05 RX ADMIN — ENOXAPARIN SODIUM 40 MILLIGRAM(S): 100 INJECTION SUBCUTANEOUS at 23:09

## 2017-02-05 RX ADMIN — Medication 1 DROP(S): at 12:08

## 2017-02-05 RX ADMIN — Medication 1 APPLICATION(S): at 17:06

## 2017-02-05 RX ADMIN — SODIUM CHLORIDE 80 MILLILITER(S): 9 INJECTION, SOLUTION INTRAVENOUS at 21:11

## 2017-02-05 RX ADMIN — QUETIAPINE FUMARATE 50 MILLIGRAM(S): 200 TABLET, FILM COATED ORAL at 17:09

## 2017-02-05 RX ADMIN — SPIRONOLACTONE 25 MILLIGRAM(S): 25 TABLET, FILM COATED ORAL at 06:53

## 2017-02-05 RX ADMIN — Medication 1 APPLICATION(S): at 21:07

## 2017-02-05 RX ADMIN — OLANZAPINE 2.5 MILLIGRAM(S): 15 TABLET, FILM COATED ORAL at 12:07

## 2017-02-05 RX ADMIN — Medication 1 APPLICATION(S): at 17:14

## 2017-02-06 LAB
BUN SERPL-MCNC: 28 MG/DL — HIGH (ref 7–23)
CALCIUM SERPL-MCNC: 9.9 MG/DL — SIGNIFICANT CHANGE UP (ref 8.4–10.5)
CHLORIDE SERPL-SCNC: 106 MMOL/L — SIGNIFICANT CHANGE UP (ref 98–107)
CO2 SERPL-SCNC: 25 MMOL/L — SIGNIFICANT CHANGE UP (ref 22–31)
CREAT SERPL-MCNC: 1.02 MG/DL — SIGNIFICANT CHANGE UP (ref 0.5–1.3)
GLUCOSE SERPL-MCNC: 67 MG/DL — LOW (ref 70–99)
HCT VFR BLD CALC: 39.6 % — SIGNIFICANT CHANGE UP (ref 39–50)
HGB BLD-MCNC: 12.7 G/DL — LOW (ref 13–17)
MCHC RBC-ENTMCNC: 29.6 PG — SIGNIFICANT CHANGE UP (ref 27–34)
MCHC RBC-ENTMCNC: 32.1 % — SIGNIFICANT CHANGE UP (ref 32–36)
MCV RBC AUTO: 92.3 FL — SIGNIFICANT CHANGE UP (ref 80–100)
PLATELET # BLD AUTO: 256 K/UL — SIGNIFICANT CHANGE UP (ref 150–400)
PMV BLD: 10.5 FL — SIGNIFICANT CHANGE UP (ref 7–13)
POTASSIUM SERPL-MCNC: 4.2 MMOL/L — SIGNIFICANT CHANGE UP (ref 3.5–5.3)
POTASSIUM SERPL-SCNC: 4.2 MMOL/L — SIGNIFICANT CHANGE UP (ref 3.5–5.3)
RBC # BLD: 4.29 M/UL — SIGNIFICANT CHANGE UP (ref 4.2–5.8)
RBC # FLD: 12.5 % — SIGNIFICANT CHANGE UP (ref 10.3–14.5)
SODIUM SERPL-SCNC: 145 MMOL/L — SIGNIFICANT CHANGE UP (ref 135–145)
WBC # BLD: 7.19 K/UL — SIGNIFICANT CHANGE UP (ref 3.8–10.5)
WBC # FLD AUTO: 7.19 K/UL — SIGNIFICANT CHANGE UP (ref 3.8–10.5)

## 2017-02-06 PROCEDURE — 99232 SBSQ HOSP IP/OBS MODERATE 35: CPT

## 2017-02-06 PROCEDURE — 99233 SBSQ HOSP IP/OBS HIGH 50: CPT

## 2017-02-06 RX ORDER — QUETIAPINE FUMARATE 200 MG/1
25 TABLET, FILM COATED ORAL ONCE
Qty: 0 | Refills: 0 | Status: COMPLETED | OUTPATIENT
Start: 2017-02-06 | End: 2017-02-06

## 2017-02-06 RX ORDER — QUETIAPINE FUMARATE 200 MG/1
25 TABLET, FILM COATED ORAL
Qty: 0 | Refills: 0 | Status: DISCONTINUED | OUTPATIENT
Start: 2017-02-06 | End: 2017-02-08

## 2017-02-06 RX ORDER — SODIUM CHLORIDE 9 MG/ML
1000 INJECTION, SOLUTION INTRAVENOUS
Qty: 0 | Refills: 0 | Status: COMPLETED | OUTPATIENT
Start: 2017-02-06 | End: 2017-02-06

## 2017-02-06 RX ADMIN — SODIUM CHLORIDE 75 MILLILITER(S): 9 INJECTION, SOLUTION INTRAVENOUS at 08:28

## 2017-02-06 RX ADMIN — QUETIAPINE FUMARATE 25 MILLIGRAM(S): 200 TABLET, FILM COATED ORAL at 11:47

## 2017-02-06 RX ADMIN — Medication 1 APPLICATION(S): at 22:28

## 2017-02-06 RX ADMIN — Medication 1 APPLICATION(S): at 06:23

## 2017-02-06 RX ADMIN — Medication 1 APPLICATION(S): at 07:34

## 2017-02-06 RX ADMIN — Medication 1 DROP(S): at 11:47

## 2017-02-06 RX ADMIN — QUETIAPINE FUMARATE 50 MILLIGRAM(S): 200 TABLET, FILM COATED ORAL at 18:00

## 2017-02-06 RX ADMIN — AMLODIPINE BESYLATE 5 MILLIGRAM(S): 2.5 TABLET ORAL at 06:23

## 2017-02-06 RX ADMIN — Medication 1 APPLICATION(S): at 18:00

## 2017-02-06 RX ADMIN — SPIRONOLACTONE 25 MILLIGRAM(S): 25 TABLET, FILM COATED ORAL at 06:23

## 2017-02-06 RX ADMIN — ENOXAPARIN SODIUM 40 MILLIGRAM(S): 100 INJECTION SUBCUTANEOUS at 22:46

## 2017-02-06 RX ADMIN — Medication 75 MICROGRAM(S): at 06:23

## 2017-02-06 RX ADMIN — Medication 1 APPLICATION(S): at 15:36

## 2017-02-06 RX ADMIN — KETOCONAZOLE 1 APPLICATION(S): 20 AEROSOL, FOAM TOPICAL at 19:48

## 2017-02-06 RX ADMIN — SODIUM CHLORIDE 75 MILLILITER(S): 9 INJECTION, SOLUTION INTRAVENOUS at 20:30

## 2017-02-06 RX ADMIN — KETOCONAZOLE 1 APPLICATION(S): 20 AEROSOL, FOAM TOPICAL at 06:22

## 2017-02-06 NOTE — DIETITIAN INITIAL EVALUATION ADULT. - PROBLEM SELECTOR PLAN 4
Diffuse rash over body; seeing derm as outpatient and wife states he was recently started on what sounds like medrol dose pack and possibly steroid cream.   - Would call dermatologist office (Dr. Ayala [phone]) prior to starting either as etiology of rash unclear

## 2017-02-06 NOTE — DIETITIAN INITIAL EVALUATION ADULT. - SOURCE
other (specify)/RN, Review of the patient's medical chart other (specify)/RN, MD, Review of the patient's medical chart

## 2017-02-06 NOTE — DIETITIAN INITIAL EVALUATION ADULT. - SIGNS/SYMPTOMS
As evidenced by decreased appetite/ PO intake PTA and at present, +edema As evidenced by hx. of dementia, poor appetite and PO intake

## 2017-02-06 NOTE — DIETITIAN INITIAL EVALUATION ADULT. - NS AS NUTRI INTERV MEALS SNACK3
Diets modified for specific foods and ingredients/Other (specify)/1. Continue diet at discretion of medical team and as consistent with pt and family wishes. 2. Monitor weights, labs, BM's, skin integrity, p.o. intake. 3. Please continue to encourage PO intake and provided assistant during meal time. 4. Monitor PO intake and weight daily/General/healthful diet

## 2017-02-06 NOTE — DIETITIAN INITIAL EVALUATION ADULT. - OTHER INFO
Pt seen for LOS. Endorses poor appetite and PO and PO supplement intake per RN. Assistance and encouragement during feeding time is provided by nursing-RN confirmed, however pt continues with poor PO intake. Pt on IV fluids- started today. No nausea/vomiting/diarrhea/constipation or difficulty chewing and swallowing reported to RDN. NKFA. Pt with worsening dementia per chart. Nutrition education is not feasible at this time. RDN remains available.

## 2017-02-06 NOTE — DIETITIAN INITIAL EVALUATION ADULT. - ETIOLOGY
related to pt meets criteria for severe malnutrition in the context of acute/chronic illness related to physiological causes

## 2017-02-06 NOTE — DIETITIAN INITIAL EVALUATION ADULT. - ENERGY NEEDS
Current weight: 186.7lbs, Ht 6'2" BMI=23.9kg/m2  +1 edema left ankle, right knee.  Skin: no pressure ulcers noted.

## 2017-02-07 PROCEDURE — 99232 SBSQ HOSP IP/OBS MODERATE 35: CPT

## 2017-02-07 RX ORDER — POLYETHYLENE GLYCOL 3350 17 G/17G
17 POWDER, FOR SOLUTION ORAL DAILY
Qty: 0 | Refills: 0 | Status: DISCONTINUED | OUTPATIENT
Start: 2017-02-07 | End: 2017-02-08

## 2017-02-07 RX ORDER — DOCUSATE SODIUM 100 MG
100 CAPSULE ORAL THREE TIMES A DAY
Qty: 0 | Refills: 0 | Status: DISCONTINUED | OUTPATIENT
Start: 2017-02-07 | End: 2017-02-08

## 2017-02-07 RX ORDER — SENNA PLUS 8.6 MG/1
2 TABLET ORAL AT BEDTIME
Qty: 0 | Refills: 0 | Status: DISCONTINUED | OUTPATIENT
Start: 2017-02-07 | End: 2017-02-08

## 2017-02-07 RX ORDER — AMLODIPINE BESYLATE 2.5 MG/1
10 TABLET ORAL DAILY
Qty: 0 | Refills: 0 | Status: DISCONTINUED | OUTPATIENT
Start: 2017-02-08 | End: 2017-02-08

## 2017-02-07 RX ADMIN — KETOCONAZOLE 1 APPLICATION(S): 20 AEROSOL, FOAM TOPICAL at 17:29

## 2017-02-07 RX ADMIN — Medication 1 APPLICATION(S): at 06:24

## 2017-02-07 RX ADMIN — Medication 75 MICROGRAM(S): at 06:30

## 2017-02-07 RX ADMIN — Medication 1 APPLICATION(S): at 06:23

## 2017-02-07 RX ADMIN — QUETIAPINE FUMARATE 25 MILLIGRAM(S): 200 TABLET, FILM COATED ORAL at 12:17

## 2017-02-07 RX ADMIN — Medication 1 APPLICATION(S): at 21:36

## 2017-02-07 RX ADMIN — ENOXAPARIN SODIUM 40 MILLIGRAM(S): 100 INJECTION SUBCUTANEOUS at 21:37

## 2017-02-07 RX ADMIN — KETOCONAZOLE 1 APPLICATION(S): 20 AEROSOL, FOAM TOPICAL at 06:24

## 2017-02-07 RX ADMIN — SPIRONOLACTONE 25 MILLIGRAM(S): 25 TABLET, FILM COATED ORAL at 06:24

## 2017-02-07 RX ADMIN — Medication 1 APPLICATION(S): at 15:50

## 2017-02-07 RX ADMIN — Medication 1 APPLICATION(S): at 17:29

## 2017-02-07 RX ADMIN — Medication 1 DROP(S): at 11:33

## 2017-02-07 RX ADMIN — AMLODIPINE BESYLATE 5 MILLIGRAM(S): 2.5 TABLET ORAL at 06:23

## 2017-02-07 RX ADMIN — QUETIAPINE FUMARATE 50 MILLIGRAM(S): 200 TABLET, FILM COATED ORAL at 17:30

## 2017-02-07 RX ADMIN — SENNA PLUS 2 TABLET(S): 8.6 TABLET ORAL at 21:40

## 2017-02-07 RX ADMIN — Medication 100 MILLIGRAM(S): at 21:37

## 2017-02-07 NOTE — PROVIDER CONTACT NOTE (OTHER) - ACTION/TREATMENT ORDERED:
Will continue to follow and reassess
Will continue to follow and reassess
Will continue to follow and reassess.

## 2017-02-08 VITALS
OXYGEN SATURATION: 100 % | SYSTOLIC BLOOD PRESSURE: 134 MMHG | RESPIRATION RATE: 18 BRPM | HEART RATE: 80 BPM | TEMPERATURE: 98 F | DIASTOLIC BLOOD PRESSURE: 83 MMHG

## 2017-02-08 PROCEDURE — 99239 HOSP IP/OBS DSCHRG MGMT >30: CPT

## 2017-02-08 RX ORDER — POLYETHYLENE GLYCOL 3350 17 G/17G
17 POWDER, FOR SOLUTION ORAL
Qty: 0 | Refills: 0 | COMMUNITY
Start: 2017-02-08

## 2017-02-08 RX ORDER — QUETIAPINE FUMARATE 200 MG/1
1 TABLET, FILM COATED ORAL
Qty: 0 | Refills: 0 | COMMUNITY
Start: 2017-02-08

## 2017-02-08 RX ORDER — QUETIAPINE FUMARATE 200 MG/1
1 TABLET, FILM COATED ORAL
Qty: 0 | Refills: 0 | COMMUNITY

## 2017-02-08 RX ORDER — SENNA PLUS 8.6 MG/1
2 TABLET ORAL
Qty: 0 | Refills: 0 | COMMUNITY
Start: 2017-02-08

## 2017-02-08 RX ORDER — MIRTAZAPINE 45 MG/1
7.5 TABLET, ORALLY DISINTEGRATING ORAL AT BEDTIME
Qty: 0 | Refills: 0 | Status: DISCONTINUED | OUTPATIENT
Start: 2017-02-08 | End: 2017-02-08

## 2017-02-08 RX ORDER — DOCUSATE SODIUM 100 MG
1 CAPSULE ORAL
Qty: 0 | Refills: 0 | COMMUNITY
Start: 2017-02-08

## 2017-02-08 RX ORDER — KETOCONAZOLE 20 MG/G
1 AEROSOL, FOAM TOPICAL
Qty: 0 | Refills: 0 | COMMUNITY
Start: 2017-02-08

## 2017-02-08 RX ORDER — SODIUM CHLORIDE 9 MG/ML
1000 INJECTION, SOLUTION INTRAVENOUS
Qty: 0 | Refills: 0 | Status: DISCONTINUED | OUTPATIENT
Start: 2017-02-08 | End: 2017-02-08

## 2017-02-08 RX ORDER — PETROLATUM,WHITE
1 JELLY (GRAM) TOPICAL
Qty: 0 | Refills: 0 | COMMUNITY
Start: 2017-02-08

## 2017-02-08 RX ORDER — MIRTAZAPINE 45 MG/1
1 TABLET, ORALLY DISINTEGRATING ORAL
Qty: 0 | Refills: 0 | COMMUNITY
Start: 2017-02-08

## 2017-02-08 RX ADMIN — SODIUM CHLORIDE 75 MILLILITER(S): 9 INJECTION, SOLUTION INTRAVENOUS at 10:39

## 2017-02-08 RX ADMIN — SPIRONOLACTONE 25 MILLIGRAM(S): 25 TABLET, FILM COATED ORAL at 06:25

## 2017-02-08 RX ADMIN — AMLODIPINE BESYLATE 10 MILLIGRAM(S): 2.5 TABLET ORAL at 06:23

## 2017-02-08 RX ADMIN — Medication 100 MILLIGRAM(S): at 12:52

## 2017-02-08 RX ADMIN — Medication 1 APPLICATION(S): at 06:24

## 2017-02-08 RX ADMIN — QUETIAPINE FUMARATE 25 MILLIGRAM(S): 200 TABLET, FILM COATED ORAL at 12:33

## 2017-02-08 RX ADMIN — POLYETHYLENE GLYCOL 3350 17 GRAM(S): 17 POWDER, FOR SOLUTION ORAL at 12:34

## 2017-02-08 RX ADMIN — Medication 100 MILLIGRAM(S): at 06:25

## 2017-02-08 RX ADMIN — Medication 75 MICROGRAM(S): at 06:25

## 2017-02-08 RX ADMIN — Medication 1 APPLICATION(S): at 18:38

## 2017-02-08 RX ADMIN — Medication 1 DROP(S): at 12:33

## 2017-02-08 RX ADMIN — KETOCONAZOLE 1 APPLICATION(S): 20 AEROSOL, FOAM TOPICAL at 06:25

## 2017-02-08 RX ADMIN — OLANZAPINE 2.5 MILLIGRAM(S): 15 TABLET, FILM COATED ORAL at 13:45

## 2017-02-08 RX ADMIN — KETOCONAZOLE 1 APPLICATION(S): 20 AEROSOL, FOAM TOPICAL at 18:36

## 2017-02-08 RX ADMIN — Medication 1 APPLICATION(S): at 12:52

## 2018-06-12 NOTE — ED ADULT NURSE NOTE - TOBACCO USE
How Severe Are Your Spot(S)?: moderate What Is The Reason For Today's Visit?: Skin Lesions What Is The Reason For Today's Visit? (Being Monitored For X): concerning skin lesions on an annual basis Never smoker

## 2020-08-21 NOTE — DIETITIAN INITIAL EVALUATION ADULT. - PROBLEM SELECTOR PLAN 1
Unclear if due to Alzheimer's vs. Lewy Body type but behavioral disturbance seems likely due to progression of dementia rather than organic cause   - Would call PCP/neurologist on Monday morning to obtain records of workup   - Check ammonia and B12 level in AM   - SW for disposition ([facility]HA vs. NH; wife wants to discuss options)  - C/w Quetiapine for behavioral disturbance; consider increasing dose if needed  - PT consult placed
room air

## 2021-01-11 NOTE — H&P ADULT. - RESPIRATORY
Please call patient.    I do recommend he come in for a follow-up chest x-ray, PA and lateral at the end of this month.  The same time he can have a fasting blood sugar and hemoglobin A1c drawn.  (please place orders) The night sweats I expect will gradually resolve as time passes.  If for some reason the last for months he will need further workup.  I do recommend he get the COVID-19 vaccine 90 days after his positive test for COVID-19.   Breath Sounds equal & clear to percussion & auscultation, no accessory muscle use

## 2021-06-25 NOTE — ED ADULT NURSE NOTE - THOUGHTS OF HOMICIDE/VIOLENCE TOWARDS OTHERS YN, MLM
Physician Medicine & Rehabilitation Consult Note         SUBJECTIVE:    Chief Complaint:  To assess rehabilitation needs following Mobility and ADL dysfunction s/p Coronary artery disease involving native coronary artery of native heart as per request of  poor motor planning and decreased safety awareness requiring max encouragement to participate in all components of treatment. Pt was able to sit forward in chair to promote trunk control with max A.    Transfers: Pt transferred sit to stand with max Ax2 a Insulin Aspart Pen  10 Units Subcutaneous TID CC   • Insulin Aspart Pen  1-5 Units Subcutaneous TID CC   • ferrous sulfate  325 mg Oral BID with meals   • metoprolol tartrate  25 mg Oral 2x Daily(Beta Blocker)   • docusate sodium  100 mg Oral BID   • Senna 06/25/21  1539 06/25/21  1600 06/25/21  1700 06/25/21  1800   BP: (!) 135/92 139/66 141/75 147/74   BP Location:  Left arm Left arm Left arm   Pulse: 87 88 90 93   Resp: 20 21 21 22   Temp: 97.3 °F (36.3 °C)      TempSrc: Temporal      SpO2:  100% 100% 100 diabetes [facility]     Benign prostatic hyperplasia     Type 2 diabetes mellitus with diabetic polyneuropathy, without long-term current use of insulin (HCC)     Vitamin D deficiency     Type 2 diabetes mellitus with microalbuminuria (HCC)     Type 2 unable to assess

## 2021-07-07 NOTE — ED ADULT NURSE NOTE - NS ED NURSE LEVEL OF CONSCIOUSNESS ORIENTATION
Problem: Nausea/Vomiting  Goal: Maintains oral/enteral intake with decreased/no reports of nausea/vomiting  Outcome: Outcome Met, Continue evaluating goal progress toward completion  Goal: Current weight maintained or increased  Outcome: Outcome Met, Continue evaluating goal progress toward completion  Goal: Patient/family/caregiver verbalizes understanding of s/s and strategies to control nausea/vomiting  Description: Document education using the patient education activity.   Outcome: Outcome Met, Continue evaluating goal progress toward completion  Goal: # Diminished episodes of nausea and vomiting  Outcome: Outcome Met, Continue evaluating goal progress toward completion      Recognizes caregiver
